# Patient Record
Sex: FEMALE | Race: WHITE | NOT HISPANIC OR LATINO | Employment: PART TIME | URBAN - METROPOLITAN AREA
[De-identification: names, ages, dates, MRNs, and addresses within clinical notes are randomized per-mention and may not be internally consistent; named-entity substitution may affect disease eponyms.]

---

## 2021-04-01 ENCOUNTER — MEDICAL CORRESPONDENCE (OUTPATIENT)
Dept: HEALTH INFORMATION MANAGEMENT | Facility: CLINIC | Age: 36
End: 2021-04-01

## 2021-04-23 ENCOUNTER — TRANSFERRED RECORDS (OUTPATIENT)
Dept: HEALTH INFORMATION MANAGEMENT | Facility: CLINIC | Age: 36
End: 2021-04-23

## 2021-05-12 ENCOUNTER — TRANSFERRED RECORDS (OUTPATIENT)
Dept: HEALTH INFORMATION MANAGEMENT | Facility: CLINIC | Age: 36
End: 2021-05-12

## 2021-07-02 ENCOUNTER — TRANSFERRED RECORDS (OUTPATIENT)
Dept: HEALTH INFORMATION MANAGEMENT | Facility: CLINIC | Age: 36
End: 2021-07-02

## 2021-08-08 ENCOUNTER — MEDICAL CORRESPONDENCE (OUTPATIENT)
Dept: HEALTH INFORMATION MANAGEMENT | Facility: CLINIC | Age: 36
End: 2021-08-08

## 2021-08-10 ENCOUNTER — TRANSCRIBE ORDERS (OUTPATIENT)
Dept: OTHER | Age: 36
End: 2021-08-10

## 2021-08-10 DIAGNOSIS — G91.1 OBSTRUCTIVE HYDROCEPHALUS (H): Primary | ICD-10-CM

## 2021-08-11 ENCOUNTER — TELEPHONE (OUTPATIENT)
Dept: NEUROLOGY | Facility: CLINIC | Age: 36
End: 2021-08-11

## 2021-08-11 NOTE — TELEPHONE ENCOUNTER
[facility]    Phone Message    May a detailed message be left on voicemail: yes     Reason for Call: Appointment Intake    [facility]  Diagnosis and/or Symptoms: Obstructive hydrocephalus     Diagnosis is not listed in the protocols. Please review and contact the patient to schedule.    Action Taken: Other: Neurology    Travel Screening: Not Applicable

## 2021-09-09 NOTE — TELEPHONE ENCOUNTER
I am not seeing  Obstructive hydrocephalus as a diagnosis under the specifications of what our providers see pts for so I am not sure who to schedule this patient with. Are there any other notes from the referring provider?

## 2021-11-02 ENCOUNTER — TRANSCRIBE ORDERS (OUTPATIENT)
Dept: OTHER | Age: 36
End: 2021-11-02

## 2021-11-02 DIAGNOSIS — G91.1 OBSTRUCTIVE HYDROCEPHALUS (H): Primary | ICD-10-CM

## 2021-11-03 ENCOUNTER — TELEPHONE (OUTPATIENT)
Dept: NEUROSURGERY | Facility: CLINIC | Age: 36
End: 2021-11-03

## 2021-11-03 NOTE — TELEPHONE ENCOUNTER
I left the patient a VM with our clinic number.      Obstructive hydrocephalus (H) [G91.1]   Comments   Referral from [facility], Sal Fontana  P: [phone]  F: [phone]     Notes   Dr. Huang, or Dr. Reynolds  First available  In person preferred, virtual ok  11/3, MD

## 2021-11-04 NOTE — TELEPHONE ENCOUNTER
RECORDS RECEIVED FROM: Extern[facility]   REASON FOR VISIT: Obstructive hydrocephalus    Date of Appt: 11/23/21   NOTES (FOR ALL VISITS) STATUS DETAILS   OFFICE NOTE from referring provider Received Dr Sal Fontana @ [facility]:  7/2/21 6/4/21 5/12/21   OFFICE NOTE from other specialist N/A    DISCHARGE SUMMARY from hospital N/A    DISCHARGE REPORT from the ER N/A    OPERATIVE REPORT N/A    MEDICATION LIST Received    IMAGING  (FOR ALL VISITS)     EMG N/A    EEG N/A    LUMBAR PUNCTURE N/A    EVERETT SCAN N/A    ULTRASOUND (CAROTID BILAT) *VASCULAR* N/A    MRI (HEAD, NECK, SPINE) Received [facility], [place]:  MRI Brain 4/23/21  MRI Head MRA/MRV 4/23/21  MRA Head Neck 4/23/21   CT (HEAD, NECK, SPINE) N/A       Action 11/4/21 MV 12.02pm   Action Taken Imaging request faxed to [facility], [place] for:  MRI Brain 4/23/21  MRI Head MRA/MRV 4/23/21  MRA Head Neck 4/23/21  Fax # [phone]  Tracking # [id number]    --11/12/21 MV 11.58am--  Images resolved in PACS

## 2021-11-09 ENCOUNTER — TELEPHONE (OUTPATIENT)
Dept: NEUROSURGERY | Facility: CLINIC | Age: 36
End: 2021-11-09
Payer: COMMERCIAL

## 2021-11-09 NOTE — TELEPHONE ENCOUNTER
[facility] Call [facility]    Phone Message    May a detailed message be left on voicemail: yes     Reason for Call: Other: Please call patient at [phone] to advise about Covid testing before 11/23/21 appointment.     Action Taken: Message routed to:  [facility]    Travel Screening: Not Applicable

## 2021-11-10 NOTE — TELEPHONE ENCOUNTER
Returned patient call regarding COVID testing. Confirmed with patient that pre-travel COVID testing is not required for upcoming visit with Dr. Huang.  Encouraged to call with any other questions/concerns prior to her appointment.

## 2021-11-23 ENCOUNTER — OFFICE VISIT (OUTPATIENT)
Dept: NEUROSURGERY | Facility: CLINIC | Age: 36
End: 2021-11-23
Payer: COMMERCIAL

## 2021-11-23 ENCOUNTER — ANCILLARY PROCEDURE (OUTPATIENT)
Dept: GENERAL RADIOLOGY | Facility: CLINIC | Age: 36
End: 2021-11-23
Attending: NEUROLOGICAL SURGERY
Payer: COMMERCIAL

## 2021-11-23 ENCOUNTER — TELEPHONE (OUTPATIENT)
Dept: NEUROSURGERY | Facility: CLINIC | Age: 36
End: 2021-11-23

## 2021-11-23 ENCOUNTER — PRE VISIT (OUTPATIENT)
Dept: NEUROSURGERY | Facility: CLINIC | Age: 36
End: 2021-11-23

## 2021-11-23 ENCOUNTER — MYC MEDICAL ADVICE (OUTPATIENT)
Dept: NEUROSURGERY | Facility: CLINIC | Age: 36
End: 2021-11-23

## 2021-11-23 VITALS
HEIGHT: 65 IN | SYSTOLIC BLOOD PRESSURE: 114 MMHG | OXYGEN SATURATION: 96 % | RESPIRATION RATE: 16 BRPM | HEART RATE: 88 BPM | BODY MASS INDEX: 20.79 KG/M2 | DIASTOLIC BLOOD PRESSURE: 71 MMHG | WEIGHT: 124.8 LBS

## 2021-11-23 DIAGNOSIS — G91.1 OBSTRUCTIVE HYDROCEPHALUS (H): Primary | ICD-10-CM

## 2021-11-23 DIAGNOSIS — G91.1 OBSTRUCTIVE HYDROCEPHALUS (H): ICD-10-CM

## 2021-11-23 PROCEDURE — 71045 X-RAY EXAM CHEST 1 VIEW: CPT | Performed by: RADIOLOGY

## 2021-11-23 PROCEDURE — 70250 X-RAY EXAM OF SKULL: CPT | Performed by: RADIOLOGY

## 2021-11-23 PROCEDURE — 74021 RADEX ABDOMEN 3+ VIEWS: CPT | Performed by: RADIOLOGY

## 2021-11-23 PROCEDURE — 99204 OFFICE O/P NEW MOD 45 MIN: CPT | Performed by: NEUROLOGICAL SURGERY

## 2021-11-23 RX ORDER — MULTIVIT WITH MINERALS/LUTEIN
1000 TABLET ORAL DAILY
COMMUNITY

## 2021-11-23 RX ORDER — GINSENG 100 MG
CAPSULE ORAL
COMMUNITY

## 2021-11-23 RX ORDER — BUPROPION HYDROCHLORIDE 300 MG/1
300 TABLET ORAL EVERY MORNING
COMMUNITY

## 2021-11-23 RX ORDER — TOPIRAMATE 200 MG/1
CAPSULE, EXTENDED RELEASE ORAL DAILY
COMMUNITY

## 2021-11-23 RX ORDER — AMOXICILLIN 500 MG
1200 CAPSULE ORAL DAILY
COMMUNITY

## 2021-11-23 RX ORDER — NORETHINDRONE ACETATE AND ETHINYL ESTRADIOL .03; 1.5 MG/1; MG/1
1 TABLET ORAL DAILY
COMMUNITY

## 2021-11-23 RX ORDER — IBUPROFEN 200 MG
TABLET ORAL
COMMUNITY

## 2021-11-23 RX ORDER — NORGESTIMATE AND ETHINYL ESTRADIOL 0.25-0.035
1 KIT ORAL DAILY
COMMUNITY
End: 2021-12-20

## 2021-11-23 RX ORDER — ACETAZOLAMIDE 500 MG/1
500 CAPSULE, EXTENDED RELEASE ORAL 2 TIMES DAILY
COMMUNITY

## 2021-11-23 ASSESSMENT — PAIN SCALES - GENERAL: PAINLEVEL: NO PAIN (0)

## 2021-11-23 ASSESSMENT — MIFFLIN-ST. JEOR: SCORE: 1254.03

## 2021-11-23 NOTE — PROGRESS NOTES
Service Date: 2021    Sal Fontana MD          RE:       Trixie Lima    MRN:   [id number]    :    1985       Dear Dr. Fontana:      I had the opportunity to see Ms. Lima today in my clinic with her significant other.  She is a patient with a [facility] shunt seen here at the [facility] for the first time.  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  During the first decade of , she had multiple revisions, most likely related to abdominal pseudocyst formation and surgery.  She currently has a VA shunt.  Her symptoms have included confusion as well as numbness involving her forearm.  When she had presented to the emergency room they had given her acetazolamide, which apparently has helped her.  She had not undergone any studies while she was in the emergency room.      She has been stable for the last 5 years, except this year she has been having extension of the numbness on her right arm involving her right side, around her shunt and around her whole arm as well as her leg.  She also has a history of migraines.  Currently, she is on medications including acetazolamide, but not any anti-seizure medications.  She has never been studied with an EEG.      On examination, this is a very pleasant woman in no apparent distress.  She, however, appears somewhat agitated.  Her speech is of normal ana and slightly slow.  Content was normal except for some flights of ideas.  She is able to use both upper extremities well.  Her numbness subjectively is on the radial side of her right arm.    She had MRIs including a study hat was an MRA that demonstrated no stenosis of her carotid.  She has colpocephaly and this appears stable in the 2 scans that were packaged with the transferred records.  From what I could see from the posterior aspect of her occipital lobe, from the MRA that appears to be stable.  Her last shunt series is 5 years ago.    As this is the  first visit for Ms. Lima, I will obtain a shunt series to observe the integrity of the tubing and the positioning and placement.  Her symptoms could have any electrical component and I will obtain an EEG.  [place] [facility] [place].  I will try and obtain as much of the studies that I can here and I will do a video visit with them once the studies are done.  Based on my view of the images, I do not feel that the pressure is necessarily high as the brain has a more flaccid appearance over the thinner components.    Sincerely,     Charanjit Huang MD        D: 2021   T: 2021   MT: nacho    Name:     KEVON LIMA  MRN:      [id number]        Account:      [id number]   :      1985           Service Date: 2021       Document: [id number]

## 2021-11-23 NOTE — NURSING NOTE
Chief Complaint   Patient presents with     Consult     Obstructive hydrocephalus     Elieser Olivares

## 2021-11-23 NOTE — PATIENT INSTRUCTIONS
Thank you for choosing [facility]. You were seen in the Neurosurgery Clinic today with Dr. Huang.    Next steps:  1. Complete a VA shunt series xray.  2. Complete an EEG. Orders have been faxed to Dr. Sal Fontana and you have also received a paper copy.  3. Schedule a video visit with Dr. Huang after these have been completed.    Please don't hesitate to reach out via MyChart or telephone if you have any questions after your visit.    Gisell Sheikh RN Care Coordinator, Neurosurgery    Direct: [phone]  Neurosurgery Clinic: [phone]

## 2021-11-23 NOTE — PROGRESS NOTES
EEG order faxed to patient's PCP Dr. Sal Fontana @ [facility] [place].    Fax # [phone]

## 2021-11-30 ENCOUNTER — CARE COORDINATION (OUTPATIENT)
Dept: NEUROSURGERY | Facility: CLINIC | Age: 36
End: 2021-11-30
Payer: COMMERCIAL

## 2021-11-30 RX ORDER — SUMATRIPTAN 50 MG/1
50 TABLET, FILM COATED ORAL
COMMUNITY

## 2021-12-12 ENCOUNTER — HEALTH MAINTENANCE LETTER (OUTPATIENT)
Age: 36
End: 2021-12-12

## 2021-12-20 ENCOUNTER — MYC MEDICAL ADVICE (OUTPATIENT)
Dept: NEUROSURGERY | Facility: CLINIC | Age: 36
End: 2021-12-20
Payer: COMMERCIAL

## 2021-12-20 RX ORDER — MAGNESIUM CARB/ALUMINUM HYDROX 105-160MG
2 TABLET,CHEWABLE ORAL DAILY
COMMUNITY

## 2021-12-23 ENCOUNTER — MYC MEDICAL ADVICE (OUTPATIENT)
Dept: NEUROSURGERY | Facility: CLINIC | Age: 36
End: 2021-12-23
Payer: COMMERCIAL

## 2021-12-31 ENCOUNTER — TELEPHONE (OUTPATIENT)
Dept: NEUROSURGERY | Facility: CLINIC | Age: 36
End: 2021-12-31
Payer: COMMERCIAL

## 2022-01-14 ENCOUNTER — TRANSFERRED RECORDS (OUTPATIENT)
Dept: HEALTH INFORMATION MANAGEMENT | Facility: CLINIC | Age: 37
End: 2022-01-14
Payer: COMMERCIAL

## 2022-04-08 ENCOUNTER — MEDICAL CORRESPONDENCE (OUTPATIENT)
Dept: HEALTH INFORMATION MANAGEMENT | Facility: CLINIC | Age: 37
End: 2022-04-08
Payer: COMMERCIAL

## 2022-05-06 ENCOUNTER — MEDICAL CORRESPONDENCE (OUTPATIENT)
Dept: HEALTH INFORMATION MANAGEMENT | Facility: CLINIC | Age: 37
End: 2022-05-06
Payer: COMMERCIAL

## 2022-07-25 ENCOUNTER — TRANSFERRED RECORDS (OUTPATIENT)
Dept: HEALTH INFORMATION MANAGEMENT | Facility: CLINIC | Age: 37
End: 2022-07-25

## 2022-10-03 ENCOUNTER — HEALTH MAINTENANCE LETTER (OUTPATIENT)
Age: 37
End: 2022-10-03

## 2022-11-22 ENCOUNTER — TELEPHONE (OUTPATIENT)
Dept: NEUROSURGERY | Facility: CLINIC | Age: 37
End: 2022-11-22

## 2023-02-01 ENCOUNTER — TRANSFERRED RECORDS (OUTPATIENT)
Dept: HEALTH INFORMATION MANAGEMENT | Facility: CLINIC | Age: 38
End: 2023-02-01
Payer: COMMERCIAL

## 2023-02-11 ENCOUNTER — HEALTH MAINTENANCE LETTER (OUTPATIENT)
Age: 38
End: 2023-02-11

## 2023-04-13 ENCOUNTER — TRANSFERRED RECORDS (OUTPATIENT)
Dept: HEALTH INFORMATION MANAGEMENT | Facility: CLINIC | Age: 38
End: 2023-04-13
Payer: COMMERCIAL

## 2024-03-09 ENCOUNTER — HEALTH MAINTENANCE LETTER (OUTPATIENT)
Age: 39
End: 2024-03-09

## 2024-07-24 ENCOUNTER — OFFICE VISIT (OUTPATIENT)
Dept: URGENT CARE | Facility: URGENT CARE | Age: 39
End: 2024-07-24
Payer: COMMERCIAL

## 2024-07-24 VITALS
HEIGHT: 63 IN | BODY MASS INDEX: 24.8 KG/M2 | HEART RATE: 105 BPM | RESPIRATION RATE: 18 BRPM | DIASTOLIC BLOOD PRESSURE: 83 MMHG | OXYGEN SATURATION: 99 % | TEMPERATURE: 97.8 F | SYSTOLIC BLOOD PRESSURE: 136 MMHG | WEIGHT: 140 LBS

## 2024-07-24 DIAGNOSIS — J32.9 RHINOSINUSITIS: Primary | ICD-10-CM

## 2024-07-24 DIAGNOSIS — J02.9 SORE THROAT: ICD-10-CM

## 2024-07-24 LAB — S PYO DNA BLD POS QL NAA+NON-PROBE: NEGATIVE

## 2024-07-24 PROCEDURE — 87651 STREP A DNA AMP PROBE: CPT

## 2024-07-24 PROCEDURE — 99203 OFFICE O/P NEW LOW 30 MIN: CPT

## 2024-07-24 RX ORDER — IBUPROFEN 200 MG
TABLET ORAL
COMMUNITY

## 2024-07-24 RX ORDER — NORETHINDRONE ACETATE AND ETHINYL ESTRADIOL .03; 1.5 MG/1; MG/1
1 TABLET ORAL DAILY
COMMUNITY

## 2024-07-24 RX ORDER — DESOGESTREL AND ETHINYL ESTRADIOL 0.15-0.03
KIT ORAL
COMMUNITY
Start: 2024-06-28

## 2024-07-24 RX ORDER — ATOMOXETINE 40 MG/1
1 CAPSULE ORAL DAILY
COMMUNITY

## 2024-07-24 RX ORDER — AMOXICILLIN AND CLAVULANATE POTASSIUM 875; 125 MG/1; MG/1
1 TABLET, FILM COATED ORAL 2 TIMES DAILY
Qty: 14 TABLET | Refills: 0 | Status: SHIPPED | OUTPATIENT
Start: 2024-07-24 | End: 2024-07-31

## 2024-07-24 RX ORDER — BUPROPION HYDROCHLORIDE 300 MG/1
1 TABLET ORAL DAILY
COMMUNITY

## 2024-07-24 RX ORDER — AMOXICILLIN 500 MG/1
CAPSULE ORAL
COMMUNITY
Start: 2023-08-18 | End: 2024-07-24 | Stop reason: ALTCHOICE

## 2024-07-24 RX ORDER — FLAXSEED OIL 1000 MG
2 CAPSULE ORAL DAILY
COMMUNITY

## 2024-07-24 RX ORDER — CLOPIDOGREL BISULFATE 75 MG/1
1 TABLET ORAL DAILY
COMMUNITY

## 2024-07-24 RX ORDER — ACETAZOLAMIDE 500 MG/1
1 CAPSULE, EXTENDED RELEASE ORAL 2 TIMES DAILY
COMMUNITY

## 2024-07-24 RX ORDER — OMEPRAZOLE 20 MG/1
20 CAPSULE, DELAYED RELEASE ORAL DAILY
COMMUNITY

## 2024-07-24 RX ORDER — GALCANEZUMAB 120 MG/ML
INJECTION, SOLUTION SUBCUTANEOUS
COMMUNITY
Start: 2024-04-18

## 2024-07-24 RX ORDER — TOPIRAMATE 200 MG/1
1 CAPSULE, EXTENDED RELEASE ORAL DAILY
COMMUNITY

## 2024-07-24 RX ORDER — ZINC GLUCONATE 50 MG
TABLET ORAL
COMMUNITY

## 2024-07-24 RX ORDER — SUMATRIPTAN SUCCINATE 50 MG/1
50 TABLET ORAL
COMMUNITY

## 2024-07-24 RX ORDER — VIT C/E/ZN/COPPR/LUTEIN/ZEAXAN 250MG-90MG
1 CAPSULE ORAL DAILY
COMMUNITY

## 2024-07-24 RX ORDER — IBUPROFEN 100 MG/5ML
1000 SUSPENSION, ORAL (FINAL DOSE FORM) ORAL DAILY
COMMUNITY

## 2024-07-24 ASSESSMENT — PAIN SCALES - GENERAL: PAINLEVEL: 1

## 2024-07-24 NOTE — PROGRESS NOTES
"Subjective      HPI    Janeth Zheng is a 38 y.o. female who presents for sinus pressure and sore throat that initially began 4 days ago.  She initially thought her symptoms were \"bad allergies\".  She then started experiencing severe sore throat.  She has not experiences a similar sore throat in the past.  Pain was much worse with swallowing.  Past 2 days, the pain is slightly improved.  However, now, she has tenderness over her maxillary sinuses.  She is also has significant nasal congestion and occasional ear pain.  Her  has had similar symptoms.  She decided to come to clinic today for a strep test and I have concern for a more severe illness.      The following have been reviewed and updated as appropriate in this visit:   Problems         Allergies   Allergen Reactions    Escitalopram Other (see comments)     Upset stomach    Ziprasidone Other (see comments)     Body twitching     Current Outpatient Medications   Medication Sig Dispense Refill    acetaZOLAMIDE ER (DIAMOX) 500 mg 12 hr capsule Take 1 capsule (500 mg total) by mouth 2 (two) times a day      ascorbic acid, vitamin C, (VITAMIN C) 1,000 mg tablet Take 1 tablet (1,000 mg total) by mouth daily      aspirin 81 mg capsule Take 1 capsule every day by oral route.      atomoxetine (STRATTERA) 40 mg capsule Take 1 capsule (40 mg total) by mouth daily      buPROPion XL (WELLBUTRIN XL) 300 mg 24 hr tablet Take 1 tablet (300 mg total) by mouth daily      cholecalciferol, vitamin D3, 25 mcg (1,000 unit) capsule Take 1 capsule (1,000 Units total) by mouth daily      clopidogreL (PLAVIX) 75 mg tablet Take 1 tablet (75 mg total) by mouth daily      Apri 0.15-0.03 mg per tablet SMARTSIG:Tablet(s) By Mouth      galcanezumab-gnlm (Emgality Pen) 120 mg/mL pen INJECT 1 ML SUBCUTANEOUSLY EVERY 28 DAYS      glucosamine ferreira 2KCl-chondroit 750-600 mg tablet Take 2 tablets by mouth daily      ibuprofen (ADVIL,MOTRIN) 200 mg tablet Take by mouth      norethindrone " "ac-eth estradioL 1.5-30 mg-mcg tablet Take 1 tablet by mouth daily      topiramate 200 mg capsule,extended release 24hr Take 1 capsule by mouth daily      ubrogepant (Ubrelvy) 100 mg tablet Take 1 tablet by mouth as needed at migraine. May repeat dose once after 2 hours if needed. Max 200mg/24 hours.      zinc gluconate 50 mg tablet Take by mouth      omeprazole (PriLOSEC) 20 mg capsule Take 1 capsule (20 mg total) by mouth daily      SUMAtriptan (IMITREX) 50 mg tablet Take 1 tablet (50 mg total) by mouth      amoxicillin-pot clavulanate (AUGMENTIN) 875-125 mg per tablet Take 1 tablet by mouth 2 (two) times a day for 7 days 14 tablet 0     No current facility-administered medications for this visit.     History reviewed. No pertinent past medical history.  History reviewed. No pertinent surgical history.  History reviewed. No pertinent family history.  Social History     Socioeconomic History    Marital status: Unknown     Social Determinants of Health     Tobacco Use: Low Risk  (11/23/2021)    Received from [facility]    Patient History     Smoking Tobacco Use: Never     Smokeless Tobacco Use: Never   Depression: Not at risk (11/23/2021)    Received from [facility]    PHQ-2     PHQ-2 Score: 1       Review of Systems    Objective     Vitals:  /83   Pulse 105   Temp 36.6 °C (97.8 °F) (Temporal)   Resp 18   Ht 1.6 m (5' 3\")   Wt 63.5 kg (140 lb)   SpO2 99%   BMI 24.80 kg/m²     Physical Exam  Constitutional:       General: She is not in acute distress.     Appearance: Normal appearance. She is normal weight. She is not ill-appearing, toxic-appearing or diaphoretic.   HENT:      Head: Normocephalic and atraumatic.      Nose: Congestion and rhinorrhea present.      Mouth/Throat:      Mouth: Mucous membranes are moist.      Pharynx: Posterior oropharyngeal erythema present. No oropharyngeal exudate.   Eyes:      General:         Right eye: No discharge.         Left eye: No discharge.      Extraocular Movements: " Extraocular movements intact.      Conjunctiva/sclera: Conjunctivae normal.      Pupils: Pupils are equal, round, and reactive to light.   Cardiovascular:      Rate and Rhythm: Normal rate and regular rhythm.      Pulses: Normal pulses.      Heart sounds: No murmur heard.     No friction rub. No gallop.   Pulmonary:      Effort: Pulmonary effort is normal. No respiratory distress.      Breath sounds: Normal breath sounds. No stridor. No wheezing, rhonchi or rales.   Musculoskeletal:      Cervical back: Normal range of motion and neck supple. No rigidity or tenderness.   Lymphadenopathy:      Cervical: No cervical adenopathy.   Skin:     General: Skin is warm and dry.      Capillary Refill: Capillary refill takes less than 2 seconds.   Neurological:      General: No focal deficit present.      Mental Status: She is alert and oriented to person, place, and time.         Recent Results (from the past 4 hour(s))   Rapid Strep A PCR Swab    Collection Time: 07/24/24  3:13 PM   Result Value Ref Range    Streptococcus Pyogenes (GRP A) PCR Negative Negative         Assessment/Plan   Diagnoses and all orders for this visit:    Rhinosinusitis  -     amoxicillin-pot clavulanate (AUGMENTIN) 875-125 mg per tablet; Take 1 tablet by mouth 2 (two) times a day for 7 days    Sore throat  -     Rapid Strep A PCR Swab        Discussion:  Patient is a 38-year-old female presenting with signs symptoms of rhinosinusitis.  Discussed likely viral cause.  Because she is traveling, she will be prescribed Augmentin to have on hand if symptoms persist for the next 5 days.  Discussed supportive cares including Flonase, nasal saline washes, warm compresses.  Should also use ibuprofen and Tylenol for pain.  She is agreeable to plan.    No follow-ups on file.  Yohannes Reilly PA-C

## 2025-06-27 NOTE — PATIENT INSTRUCTIONS
For supportive cares, recommend Tylenol for pain relief.  Nasal saline washes, Flonase, warm compresses for sinus congestion pain.  Do not use Sudafed for greater than 3 days.    If symptoms persist beyond the next few days, I recommend starting the antibiotic, Augmentin.  If you start this medicine, take it twice per day for 7 days.  You may start in the next couple days if symptoms worsen.   [facility] Note    Chief Complaint      Chief Complaint   Patient presents with    Follow-up       History of Present Illness      Toño Ye is a 60 y.o. male who presents today for   Chief Complaint   Patient presents with    Follow-up   .  Patient comes to appointment here for 6m checkup for chronic issues as below . Lasb reviewed tg much better and A1c is dropping as well he si coaintiht diet ad meds .. He is under some stress at work and has restarted his Wellbutrin     HPI    No problem-specific Assessment & Plan notes found for this encounter.       Problem List Items Addressed This Visit       Diabetes mellitus without complication    Overview   a1c 7 .5 little better is doing better with diet diet . He will get strict againe cont gabapentin          ISAIAH (obstructive sleep apnea)    Overview   Utilizing cpap with great benefit using allnight everynight          Anxiety and depression - Primary    Overview   He will stay on welbutrin at current dose every other day for 2 weeks then  stop         Benign essential hypertension    Overview   Bp well controlled on current regimen   He is concerned about cardiovascular issues as several of his friends have had mi recently . He requests cardilogy referral          Mixed hyperlipidemia    Overview   Cont lipitor 80 mg , add lovaza              Past Medical History:  Past Medical History:   Diagnosis Date    Diabetes mellitus     Hyperlipidemia     Hypertension        Past Surgical History:  Past Surgical History:   Procedure Laterality Date    COLONOSCOPY  11/20/2017    COLONOSCOPY N/A 5/17/2023    Procedure: COLONOSCOPY;  Surgeon: Simone Lemus MD;  Location: [facility] ([address] [place] [address]);  Service: Colon and Rectal;  Laterality: N/A;  instr emailed/portal-GT    KNEE SURGERY         Family History:  family history includes Arthritis in his father and mother; Cancer in his father; Depression in his sister; Diabetes in his father;  "Drug abuse in his brother; Heart disease in his father; Hyperlipidemia in his father; Hypertension in his father and mother; Lung cancer in his father.     Social History:  Social History[1]    Review of Systems:   Review of Systems   Constitutional:  Negative for fever and weight loss.   HENT:  Negative for congestion, hearing loss and sore throat.    Eyes:  Negative for blurred vision.   Respiratory:  Negative for cough and shortness of breath.    Cardiovascular:  Negative for chest pain, palpitations, claudication and leg swelling.   Gastrointestinal:  Negative for abdominal pain, constipation, diarrhea and heartburn.   Genitourinary:  Negative for dysuria.   Musculoskeletal:  Negative for back pain and myalgias.   Skin:  Negative for rash.   Neurological:  Negative for focal weakness and headaches.   Psychiatric/Behavioral:  Negative for depression and suicidal ideas. The patient is nervous/anxious.         Medications:  Encounter Medications[2]    Allergies:  Review of patient's allergies indicates:  No Known Allergies      Physical Exam      Vitals:    06/27/25 1325   BP: 118/80   Pulse: 74   Resp: 18        Vital Signs  Pulse: 74  Resp: 18  SpO2: 95 %  BP: 118/80  BP Location: Right arm  Patient Position: Sitting  Pain Score: 0-No pain  Height and Weight  Height: 5' 6.5" (168.9 cm)  Weight: 115.1 kg (253 lb 12 oz)  BSA (Calculated - sq m): 2.32 sq meters  BMI (Calculated): 40.3  Weight in (lb) to have BMI = 25: 156.9]     Body mass index is 40.34 kg/m².    Physical Exam  Constitutional:       Appearance: He is well-developed.   HENT:      Head: Normocephalic.   Eyes:      Pupils: Pupils are equal, round, and reactive to light.   Neck:      Thyroid: No thyromegaly.   Cardiovascular:      Rate and Rhythm: Normal rate and regular rhythm.      Heart sounds: No murmur heard.     No friction rub. No gallop.   Pulmonary:      Effort: Pulmonary effort is normal.      Breath sounds: Normal breath sounds.   Abdominal: " "     General: Bowel sounds are normal.      Palpations: Abdomen is soft.   Musculoskeletal:         General: Normal range of motion.      Cervical back: Normal range of motion.   Skin:     General: Skin is warm and dry.   Neurological:      Mental Status: He is alert and oriented to person, place, and time.      Sensory: No sensory deficit.   Psychiatric:         Behavior: Behavior normal.          Laboratory:  CBC:  Recent Labs   Lab Result Units 06/19/25  0851   WBC K/uL 7.32   RBC M/uL 5.50   HGB gm/dL 15.7   HCT % 47.4   Platelet Count K/uL 319   MCV fL 86   MCH pg 28.5   MCHC g/dL 33.1     CMP:  Recent Labs   Lab Result Units 06/19/25  0851   Glucose mg/dL 140*   Calcium mg/dL 9.6   Albumin g/dL 4.4   Protein Total gm/dL 7.4   Sodium mmol/L 143   Potassium mmol/L 4.4   CO2 mmol/L 26   Chloride mmol/L 104   BUN mg/dL 36*   ALP unit/L 78   ALT unit/L 41   AST unit/L 26   Bilirubin Total mg/dL 1.0     URINALYSIS:  No results for input(s): "COLORU", "CLARITYU", "SPECGRAV", "PHUR", "PROTEINUA", "GLUCOSEU", "BILIRUBINCON", "BLOODU", "WBCU", "RBCU", "BACTERIA", "MUCUS", "NITRITE", "LEUKOCYTESUR", "UROBILINOGEN", "HYALINECASTS" in the last 2160 hours.   LIPIDS:  Recent Labs   Lab Result Units 06/19/25  0851   HDL Cholesterol mg/dL 27*   Cholesterol Total mg/dL 118*   Triglyceride mg/dL 261*   LDL Cholesterol mg/dL 38.8*   HDL/Cholesterol Ratio % 22.9   Non HDL Cholesterol mg/dL 91   Cholesterol/HDL Ratio  4.4     TSH:  No results for input(s): "TSH" in the last 2160 hours.  A1C:  Recent Labs   Lab Result Units 06/19/25  0851   Hemoglobin A1c % 7.5*       Radiology:        Assessment:     Toño Ye is a 60 y.o.male with:    Anxiety and depression  -     buPROPion (WELLBUTRIN XL) 150 MG TB24 tablet; Take 1 tablet (150 mg total) by mouth once daily.  Dispense: 30 tablet; Refill: 5    Diabetes mellitus without complication    ISAIAH (obstructive sleep apnea)    Benign essential hypertension    Mixed " hyperlipidemia                Plan:     Problem List Items Addressed This Visit       Diabetes mellitus without complication    Overview   a1c 7 .5 little better is doing better with diet diet . He will get strict againe cont gabapentin          ISAIAH (obstructive sleep apnea)    Overview   Utilizing cpap with great benefit using allnight everynight          Anxiety and depression - Primary    Overview   He will stay on welbutrin at current dose every other day for 2 weeks then  stop         Benign essential hypertension    Overview   Bp well controlled on current regimen   He is concerned about cardiovascular issues as several of his friends have had mi recently . He requests cardilogy referral          Mixed hyperlipidemia    Overview   Cont lipitor 80 mg , add lovaza             As above, continue current medications and maintain follow up with specialists.  Return to clinic in 6 months.      [facility]                       [1]   Social History  Socioeconomic History    Marital status:    Tobacco Use    Smoking status: Never    Smokeless tobacco: Never   Substance and Sexual Activity    Alcohol use: Yes     Alcohol/week: 4.0 standard drinks of alcohol     Types: 2 Glasses of wine, 2 Drinks containing 0.5 oz of alcohol per week    Drug use: Never    Sexual activity: Yes     Partners: Female     Birth control/protection: None   [2]   Outpatient Encounter Medications as of 6/27/2025   Medication Sig Dispense Refill    atorvastatin (LIPITOR) 80 MG tablet TAKE 1 TABLET (80 MG TOTAL) BY MOUTH ONCE DAILY. 90 tablet 3    metFORMIN (GLUCOPHAGE-XR) 750 MG ER 24hr tablet Take 1 tablet (750 mg total) by mouth Daily. 90 tablet 3    nebivoloL (BYSTOLIC) 10 MG Tab TAKE 1 TABLET BY MOUTH EVERY DAY 90 tablet 3    nebivoloL (BYSTOLIC) 10 MG Tab Take 1 tablet (10 mg total) by mouth once daily. 90 tablet 3    omega-3 acid ethyl esters (LOVAZA) 1 gram capsule Take 2  capsules (2 g total) by mouth 2 (two) times daily. 360 capsule 3    traZODone (DESYREL) 50 MG tablet TAKE 1 TABLET BY MOUTH EVERY EVENING. 90 tablet 2    [DISCONTINUED] buPROPion (WELLBUTRIN SR) 150 MG TBSR 12 hr tablet Take 150 mg by mouth 2 (two) times daily.      buPROPion (WELLBUTRIN XL) 150 MG TB24 tablet Take 1 tablet (150 mg total) by mouth once daily. 30 tablet 5     No facility-administered encounter medications on file as of 6/27/2025.